# Patient Record
Sex: MALE | URBAN - METROPOLITAN AREA
[De-identification: names, ages, dates, MRNs, and addresses within clinical notes are randomized per-mention and may not be internally consistent; named-entity substitution may affect disease eponyms.]

---

## 2018-02-12 ENCOUNTER — COMMUNICATION - HEALTHEAST (OUTPATIENT)
Dept: SLEEP MEDICINE | Facility: CLINIC | Age: 63
End: 2018-02-12

## 2018-02-12 ENCOUNTER — OFFICE VISIT - HEALTHEAST (OUTPATIENT)
Dept: FAMILY MEDICINE | Facility: CLINIC | Age: 63
End: 2018-02-12

## 2018-02-12 DIAGNOSIS — Z12.11 COLON CANCER SCREENING: ICD-10-CM

## 2018-02-12 DIAGNOSIS — Z00.00 ENCOUNTER FOR ROUTINE ADULT MEDICAL EXAMINATION: ICD-10-CM

## 2018-02-12 DIAGNOSIS — L91.8 SKIN TAGS, MULTIPLE ACQUIRED: ICD-10-CM

## 2018-02-12 DIAGNOSIS — G47.33 OSA ON CPAP: ICD-10-CM

## 2018-02-12 LAB
ANION GAP SERPL CALCULATED.3IONS-SCNC: 11 MMOL/L (ref 5–18)
BUN SERPL-MCNC: 14 MG/DL (ref 8–22)
CALCIUM SERPL-MCNC: 8.9 MG/DL (ref 8.5–10.5)
CHLORIDE BLD-SCNC: 107 MMOL/L (ref 98–107)
CHOLEST SERPL-MCNC: 177 MG/DL
CO2 SERPL-SCNC: 24 MMOL/L (ref 22–31)
CREAT SERPL-MCNC: 1.28 MG/DL (ref 0.7–1.3)
ERYTHROCYTE [DISTWIDTH] IN BLOOD BY AUTOMATED COUNT: 12.5 % (ref 11–14.5)
FASTING STATUS PATIENT QL REPORTED: YES
GFR SERPL CREATININE-BSD FRML MDRD: 57 ML/MIN/1.73M2
GLUCOSE BLD-MCNC: 94 MG/DL (ref 70–125)
HCT VFR BLD AUTO: 44.5 % (ref 40–54)
HDLC SERPL-MCNC: 48 MG/DL
HGB BLD-MCNC: 14.9 G/DL (ref 14–18)
LDLC SERPL CALC-MCNC: 98 MG/DL
MCH RBC QN AUTO: 30.8 PG (ref 27–34)
MCHC RBC AUTO-ENTMCNC: 33.4 G/DL (ref 32–36)
MCV RBC AUTO: 92 FL (ref 80–100)
PLATELET # BLD AUTO: 257 THOU/UL (ref 140–440)
PMV BLD AUTO: 8.7 FL (ref 7–10)
POTASSIUM BLD-SCNC: 4.8 MMOL/L (ref 3.5–5)
RBC # BLD AUTO: 4.83 MILL/UL (ref 4.4–6.2)
SODIUM SERPL-SCNC: 142 MMOL/L (ref 136–145)
TRIGL SERPL-MCNC: 156 MG/DL
WBC: 5.1 THOU/UL (ref 4–11)

## 2018-02-12 ASSESSMENT — MIFFLIN-ST. JEOR: SCORE: 2163.79

## 2018-02-26 ENCOUNTER — RECORDS - HEALTHEAST (OUTPATIENT)
Dept: ADMINISTRATIVE | Facility: OTHER | Age: 63
End: 2018-02-26

## 2018-03-26 ENCOUNTER — OFFICE VISIT - HEALTHEAST (OUTPATIENT)
Dept: SLEEP MEDICINE | Facility: CLINIC | Age: 63
End: 2018-03-26

## 2018-03-26 DIAGNOSIS — E66.9 OBESITY: ICD-10-CM

## 2018-03-26 DIAGNOSIS — G47.33 OSA ON CPAP: ICD-10-CM

## 2018-03-26 DIAGNOSIS — G47.8 SLEEP DYSFUNCTION WITH SLEEP STAGE DISTURBANCE: ICD-10-CM

## 2018-03-26 ASSESSMENT — MIFFLIN-ST. JEOR: SCORE: 2174.29

## 2018-04-11 ENCOUNTER — RECORDS - HEALTHEAST (OUTPATIENT)
Dept: ADMINISTRATIVE | Facility: OTHER | Age: 63
End: 2018-04-11

## 2018-04-12 ENCOUNTER — RECORDS - HEALTHEAST (OUTPATIENT)
Dept: ADMINISTRATIVE | Facility: OTHER | Age: 63
End: 2018-04-12

## 2018-05-24 ENCOUNTER — OFFICE VISIT - HEALTHEAST (OUTPATIENT)
Dept: SLEEP MEDICINE | Facility: CLINIC | Age: 63
End: 2018-05-24

## 2018-05-24 DIAGNOSIS — G47.8 SLEEP DYSFUNCTION WITH SLEEP STAGE DISTURBANCE: ICD-10-CM

## 2018-05-24 DIAGNOSIS — G47.33 OSA ON CPAP: ICD-10-CM

## 2018-05-24 DIAGNOSIS — E66.9 OBESITY: ICD-10-CM

## 2018-05-24 ASSESSMENT — MIFFLIN-ST. JEOR: SCORE: 2164.76

## 2018-08-31 ENCOUNTER — COMMUNICATION - HEALTHEAST (OUTPATIENT)
Dept: SLEEP MEDICINE | Facility: CLINIC | Age: 63
End: 2018-08-31

## 2021-06-01 VITALS — BODY MASS INDEX: 46.62 KG/M2 | WEIGHT: 307.6 LBS | HEIGHT: 68 IN

## 2021-06-01 VITALS — BODY MASS INDEX: 46.53 KG/M2 | WEIGHT: 307 LBS | HEIGHT: 68 IN

## 2021-06-01 VITALS — HEIGHT: 68 IN | WEIGHT: 309.7 LBS | BODY MASS INDEX: 46.94 KG/M2

## 2021-06-16 PROBLEM — G47.33 OSA ON CPAP: Status: ACTIVE | Noted: 2018-02-12

## 2021-06-16 PROBLEM — K63.5 COLON POLYPS: Status: ACTIVE | Noted: 2018-04-16

## 2021-06-16 NOTE — PROGRESS NOTES
Dear Dr. Carley Kraft Md  [address] [address]    Thank you for the opportunity to participate in the care of Mr. Fahad Garcia.    He is a 62 y.o. male who comes to the clinic for the establishment of care for his obstructive sleep apnea.  The patient was actually diagnosed on 2012 with an apnea hypopnea index of 68.1 events per hour with the lowest O2 sat of 85%.  For further details concerning the patient's sleep study please refer to the \[facility] [place] [facility]\"".  The patient has been on CPAP therapy since that time and would like to establish care so he can get a new CPAP machine.  The patient's review of systems is otherwise unremarkable.     Ideal Sleep-Wake Cycle(devoid of societal pressure):    Patient would try to initiate sleep at around 1 AM with a sleep latency of 10 minutes. The patient would have 0 nocturnal awakening. Final wake up time is around 9 AM.    Compliance Download data for 90 days:  Pressure settin CWP  Residual AHI: 0.4 events per hour  Leak: Minimal  Compliance: 100%  Mask Tolerance: Good  Skin irritation: None    Past Medical History  Past Medical History:   Diagnosis Date     Sleep apnea, obstructive         Past Surgical History  Past Surgical History:   Procedure Laterality Date     negative history          Meds  No current outpatient prescriptions on file.     No current facility-administered medications for this visit.         Allergies  Review of patient's allergies indicates no known allergies.     Social History  Social History     Social History     Marital status:      Spouse name: N/A     Number of children: N/A     Years of education: N/A     Occupational History     Not on file.     Social History Main Topics     Smoking status: Never Smoker     Smokeless tobacco: Never Used     Alcohol use No     Drug use: No     Sexual activity: Not on file     Other Topics Concern     Not on file     Social History Narrative        Family History  Family History    Problem Relation Age of Onset     Adopted: Yes     Rheum arthritis Mother      Diabetes Sister         Review of Systems:  Constitutional: Negative except as noted in HPI.   Eyes: Negative except as noted in HPI.   ENT: Negative except as noted in HPI.   Cardiovascular: Negative except as noted in HPI.   Respiratory: Negative except as noted in HPI.   Gastrointestinal: Negative except as noted in HPI.   Genitourinary: Negative except as noted in HPI.   Musculoskeletal: Negative except as noted in HPI.   Integumentary: Negative except as noted in HPI.   Neurological: Negative except as noted in HPI.   Psychiatric: Negative except as noted in HPI.   Endocrine: Negative except as noted in HPI.   Hematologic/Lymphatic: Negative except as noted in HPI.      STOP BANG 3/26/2018   Do you snore loudly (louder than talking or loud enough to be heard through closed doors)? 1   Do you often feel tired, fatigued, or sleepy during daytime? 0   Has anyone observed you stop breathing in your sleep? 1   Do you have or are you being treated for high blood pressure? 0   BMI more than 35 kg/m2 1   Age over 50 years old? 1   Neck circumference greater than 16 inches? 1   Gender male? 1   Total Score 6   Epworths Sleepiness Scale 3/26/2018   Sitting and reading 1   Watching TV 2   Sitting, inactive in a public place (e.g. a theatre or a meeting) 0   As a passenger in a car for an hour without a break 1   Lying down to rest in the afternoon when circumstances permit 3   Sitting and talking to someone 0   Sitting quietly after a lunch without alcohol 1   In a car, while stopped for a few minutes in traffic 0   Total score 8   Rooming 3/26/2018   Usual bedtime 100 am   Sleep Latency 10 mn   Awakenings 0   Wake Up Time 730   Energy Drinks 0   Coffee 0   Cola 1-2 daily   Difficulty falling asleep No   Difficulty staying asleep No   Excessive daytime tiredness Yes   Excessive daytime sleepiness No   Dozing off while driving No   Shift  "Worker No   Sleep Walking? No   Sleep Talking? No   Kicking or punching? No   Restless legs symptoms Yes       Physical Exam:  /86  Pulse (!) 57  Ht 5' 8\" (1.727 m)  Wt (!) 309 lb 11.2 oz (140.5 kg)  SpO2 97%  BMI 47.09 kg/m2  BMI:Body mass index is 47.09 kg/(m^2).   GEN: NAD, morbidly obese  Head: Normocephalic.  EYES: PERRLA, EOMI  ENT: Oropharynx is clear, mallampatti class 4+ airway.   Nasal mucosa is moist without erythema  Neck : Thyroid is within normal limits.  CV: Regular rate and rhythm, S1 & S2 positive.  LUNGS: Bilateral breathsounds heard.   ABDOMEN: Positive bowel sounds in all quadrants, soft, no rebound or guarding  MUSCULOSKELETAL: Bilateral trace leg swelling  SKIN: warm, dry, no rashes  Neurological: Alert, oriented to time, place, and person.  Psych: normal mood, normal affect     Labs/Studies:     Lab Results   Component Value Date    WBC 5.1 02/12/2018    HGB 14.9 02/12/2018    HCT 44.5 02/12/2018    MCV 92 02/12/2018     02/12/2018         Chemistry        Component Value Date/Time     02/12/2018 0911    K 4.8 02/12/2018 0911     02/12/2018 0911    CO2 24 02/12/2018 0911    BUN 14 02/12/2018 0911    CREATININE 1.28 02/12/2018 0911    GLU 94 02/12/2018 0911        Component Value Date/Time    CALCIUM 8.9 02/12/2018 0911            No results found for: FERRITIN  No results found for: TSH      Assessment and Plan:  In summary Fahad Garcia is a 62 y.o. year old male here for transfer of care.  1.  Obstructive sleep apnea on CPAP  I informed the patient that I am very happy with his level of CPAP usage.  Since we have the original sleep study in our system, there is no need to repeat a sleep study at this point in time.  We discussed the different durable medical equipment companies available.  He would like to call his insurance company to see which company is their preferred DME before making a final decision.  I will write for the patient to get a new CPAP " machine if he qualifies.  The prescription is waiting for him in the chart.  He will call us back once he finds out which DME he would like to use.  2.  Other sleep disturbance  3.  Obesity    Patient verbalized understanding of these issues, agrees with the plan and all questions were answered today. Patient was given an opportuntity to voice any other symptoms or concerns not listed above. Patient did not have any other symptoms or concerns.      Follow-up in 6 weeks and bring his new CPAP machine with him.     Aaron Brown DO  Board Certified in Internal Medicine and Sleep Medicine  [facility].    (Note created with Dragon voice recognition and unintended spelling errors and word substitutions may occur)

## 2021-06-16 NOTE — PROGRESS NOTES
Assessment:     Fahad was seen today for establish care and annual exam.    Diagnoses and all orders for this visit:    Encounter for routine adult medical examination  -     HM2(CBC w/o Differential)  -     Basic Metabolic Panel  -     Lipid Pepin FASTING    MARITZA on CPAP: Has not followed up in years. Desires referral today.  -     Ambulatory referral to Sleep Medicine    Colon cancer screening: Hx of colonoscopy about 10 years ago with polyp and had 5 year recommended follow-up. Asymptomatic today. Referred for colonoscopy.  -     Ambulatory referral for Colonoscopy    Skin tags, multiple acquired: Across eyelids. Patient desires dermatology referral today.  -     Ambulatory referral to Dermatology      Subjective:      Fahad Garcia is a 62 y.o. male who presents for an annual exam.     Healthy Habits:   Regular Exercise: yes  Sunscreen Use: No  Healthy Diet: No  Dental Visits Regularly: No  Seat Belt: Yes  Sexually active: Yes  Monthly Self Testicular Exams:  Yes  Hemoccults: N/A  Flex Sig: N/A  Colonoscopy: Yes but 10 years ago  Lipid Profile: due today  Glucose Screen: due today  Guns at Home:  No      Immunization History   Administered Date(s) Administered     DT (pediatric) 09/15/2000     Influenza, inj, historic,unspecified 11/28/2008     Td,adult,historic,unspecified 11/28/2008     Tdap 11/28/2008     Immunization status: up to date and documented.  Medications;  None    Past Medical History:   Diagnosis Date     Sleep apnea, obstructive      Past Surgical History:   Procedure Laterality Date     negative history       Review of patient's allergies indicates no known allergies.  Family History   Problem Relation Age of Onset     Adopted: Yes     Rheum arthritis Mother      Diabetes Sister      Social History     Social History     Marital status:      Spouse name: N/A     Number of children: N/A     Years of education: N/A     Occupational History     Works as therapist     Social History Main  "Topics     Smoking status: Never Smoker     Smokeless tobacco: Never Used     Alcohol use No     Drug use: No     Sexual activity: Not on file     Other Topics Concern     Not on file     Social History Narrative     No narrative on file       Review of Systems  General:  Denies problem  Eyes: Denies problem  Ears/Nose/Throat: Denies problem  Cardiovascular: Denies problem  Respiratory:  Denies problem  Gastrointestinal:  Denies problem  Genitourinary: Denies problem  Musculoskeletal:  Denies problem  Skin: Denies problem  Neurologic: Denies problem  Psychiatric: Denies problem  Endocrine: Denies problem  Heme/Lymphatic: Denies problem   Allergic/Immunologic: Denies problem        Objective:     Vitals:    02/12/18 0825   BP: 128/80   Pulse: (!) 58   Resp: 14   Temp: 97.9  F (36.6  C)   TempSrc: Oral   Weight: (!) 307 lb (139.3 kg)   Height: 5' 8.11\" (1.73 m)     Body mass index is 46.53 kg/(m^2).    Physical  General Appearance: Alert, cooperative, no distress, appears stated age  Head: Normocephalic, without obvious abnormality, atraumatic  Eyes: PERRL, conjunctiva/corneas clear, EOM's intact. Multiple skin tags across eyelids.  Ears: Cerumen impaction bilaterally, cannot visualize TM  Nose: Nares normal, septum midline,mucosa normal, no drainage  Throat: Lips, mucosa, and tongue normal; teeth and gums normal  Neck: Supple, symmetrical, trachea midline, no adenopathy;  thyroid: not enlarged, symmetric, no tenderness/mass/nodules  Back: Symmetric, no curvature  Lungs: Clear to auscultation bilaterally, respirations unlabored  Heart: Regular rate and rhythm, S1 and S2 normal, no murmur, rub, or gallop,  Abdomen: Soft, non-tender, bowel sounds active all four quadrants,  no masses, no organomegaly  Genitourinary: Prostate exam normal, no nodules or enlargement on palpation  Musculoskeletal: Normal range of motion. No joint swelling or deformity.   Extremities: Extremities normal, atraumatic, no cyanosis or " edema  Skin: Skin color, texture, turgor normal, no rashes or lesions  Lymph nodes: Cervical nodes normal.  Neurologic: He is alert. He has normal reflexes.   Psychiatric: He has a normal mood and affect.     Carley Kraft MD

## 2021-06-18 NOTE — PROGRESS NOTES
Dear Dr. Carley Kraft MD  [address] [address],    Thank you for the opportunity to participate in the care of Fahad Garcia.     He is a 62 y.o. y/o male patient who comes to the sleep medicine clinic for follow up.  Since the patient's last clinical visit with me he has received a new CPAP machine from [employer].  However the patient states that the pressures started off a bit too low and was wondering if he can be changed back to his original setting.  I informed the patient that my prescription was written to keep him on a CPAP pressure of 12 cm of water.  For some reason the patient's durable medical equipment company decided to put him on a pressure range of 5-12 CWP which I did not write for.  Patient became angry at me for not informing him that this could occur.  I informed the patient that I did tell him that these mistakes have been known to occur in the past and that he should discuss this with his durable medical equipment company.  I will print out the original prescription that I wrote and recommend that he follow up with any medical to address this issue. Due to the fact that [employer] is not part of our network, I have no control over what they do. I strongly recommend that he discuss this with [employer] himself.    Compliance Download data for 12 days:  Pressure settin-12 CWP  Residual AHI: 0.4 events per hour  Leak: Minimal  Compliance: 100%  Mask Tolerance: Good  Skin irritation: None      Past Medical History:   Diagnosis Date     Sleep apnea, obstructive        Past Surgical History:   Procedure Laterality Date     negative history         Social History     Social History     Marital status:      Spouse name: N/A     Number of children: N/A     Years of education: N/A     Occupational History     Not on file.     Social History Main Topics     Smoking status: Never Smoker     Smokeless tobacco: Never Used     Alcohol use No     Drug use: No     Sexual  "activity: Not on file     Other Topics Concern     Not on file     Social History Narrative       No current outpatient prescriptions on file.     No current facility-administered medications for this visit.        No Known Allergies    Physical Exam:  /77  Pulse (!) 59  Ht 5' 8\" (1.727 m)  Wt (!) 307 lb 9.6 oz (139.5 kg)  SpO2 94%  BMI 46.77 kg/m2  BMI:Body mass index is 46.77 kg/(m^2).   GEN: NAD, obese  Psych: normal mood, normal affect    Labs/Studies:     I reviewed the efficacy and compliance report from his device. Data summarized on the HPI and the CPAP compliance flow sheet.     Lab Results   Component Value Date    WBC 5.1 02/12/2018    HGB 14.9 02/12/2018    HCT 44.5 02/12/2018    MCV 92 02/12/2018     02/12/2018         Chemistry        Component Value Date/Time     02/12/2018 0911    K 4.8 02/12/2018 0911     02/12/2018 0911    CO2 24 02/12/2018 0911    BUN 14 02/12/2018 0911    CREATININE 1.28 02/12/2018 0911    GLU 94 02/12/2018 0911        Component Value Date/Time    CALCIUM 8.9 02/12/2018 0911            No results found for: FERRITIN         Assessment and Plan:  In summary Fahad Garcia is a 62 y.o. year old male who is here for compliance download review.    1.  Obstructive sleep apnea on CPAP  I will print out the original prescription that I wrote for the patient and given the phone number to contact [facility] medical.  I strongly advised the patient to discuss the prescription settings with [facility] and for them to follow my order.  2.  Other sleep disturbance  3.  Obesity     Patient verbalized understanding of these issues, agrees with the plan and all questions were answered today. Patient was given an opportuntity to voice any other symptoms or concerns not listed above. Patient did not have any other symptoms or concerns.      Patient told to return in 12 months as needed. Patient instructed to stop at  to schedule appointment before leaving " today.    Aaron Brown DO  Board Certified in Internal Medicine and Sleep Medicine  [facility].    I spent a total of 15 minutes of face-to-face encounter and more than 50% of the encounter was used for counseling or coordination of care.    (Note created with Dragon voice recognition and unintended spelling errors and word substitutions may occur)

## 2021-07-17 ENCOUNTER — HEALTH MAINTENANCE LETTER (OUTPATIENT)
Age: 66
End: 2021-07-17

## 2021-09-11 ENCOUNTER — HEALTH MAINTENANCE LETTER (OUTPATIENT)
Age: 66
End: 2021-09-11

## 2022-08-13 ENCOUNTER — HEALTH MAINTENANCE LETTER (OUTPATIENT)
Age: 67
End: 2022-08-13

## 2022-10-30 ENCOUNTER — HEALTH MAINTENANCE LETTER (OUTPATIENT)
Age: 67
End: 2022-10-30

## 2022-12-17 ENCOUNTER — HEALTH MAINTENANCE LETTER (OUTPATIENT)
Age: 67
End: 2022-12-17

## 2024-01-21 ENCOUNTER — HEALTH MAINTENANCE LETTER (OUTPATIENT)
Age: 69
End: 2024-01-21